# Patient Record
Sex: FEMALE | URBAN - METROPOLITAN AREA
[De-identification: names, ages, dates, MRNs, and addresses within clinical notes are randomized per-mention and may not be internally consistent; named-entity substitution may affect disease eponyms.]

---

## 2018-08-02 LAB — HEMOCCULT STL QL IA: NEGATIVE

## 2018-08-14 ENCOUNTER — TRANSFERRED RECORDS (OUTPATIENT)
Dept: HEALTH INFORMATION MANAGEMENT | Facility: CLINIC | Age: 63
End: 2018-08-14

## 2019-02-21 ENCOUNTER — TRANSFERRED RECORDS (OUTPATIENT)
Dept: HEALTH INFORMATION MANAGEMENT | Facility: CLINIC | Age: 64
End: 2019-02-21

## 2019-03-21 ENCOUNTER — TRANSFERRED RECORDS (OUTPATIENT)
Dept: HEALTH INFORMATION MANAGEMENT | Facility: CLINIC | Age: 64
End: 2019-03-21

## 2019-03-22 ENCOUNTER — TRANSFERRED RECORDS (OUTPATIENT)
Dept: HEALTH INFORMATION MANAGEMENT | Facility: CLINIC | Age: 64
End: 2019-03-22

## 2019-03-22 ENCOUNTER — MEDICAL CORRESPONDENCE (OUTPATIENT)
Dept: HEALTH INFORMATION MANAGEMENT | Facility: CLINIC | Age: 64
End: 2019-03-22

## 2019-04-01 ENCOUNTER — DOCUMENTATION ONLY (OUTPATIENT)
Dept: CARE COORDINATION | Facility: CLINIC | Age: 64
End: 2019-04-01

## 2019-04-01 DIAGNOSIS — M54.9 BACK PAIN: Primary | ICD-10-CM

## 2019-04-04 ENCOUNTER — ANCILLARY PROCEDURE (OUTPATIENT)
Dept: GENERAL RADIOLOGY | Facility: CLINIC | Age: 64
End: 2019-04-04
Attending: NEUROLOGICAL SURGERY
Payer: COMMERCIAL

## 2019-04-04 ENCOUNTER — OFFICE VISIT (OUTPATIENT)
Dept: NEUROSURGERY | Facility: CLINIC | Age: 64
End: 2019-04-04
Payer: COMMERCIAL

## 2019-04-04 VITALS
SYSTOLIC BLOOD PRESSURE: 129 MMHG | HEART RATE: 74 BPM | OXYGEN SATURATION: 96 % | WEIGHT: 139.7 LBS | DIASTOLIC BLOOD PRESSURE: 83 MMHG

## 2019-04-04 DIAGNOSIS — E55.9 VITAMIN D DEFICIENCY: ICD-10-CM

## 2019-04-04 DIAGNOSIS — M85.80 OSTEOPENIA DETERMINED BY X-RAY: ICD-10-CM

## 2019-04-04 DIAGNOSIS — M41.25 OTHER IDIOPATHIC SCOLIOSIS, THORACOLUMBAR REGION: Primary | ICD-10-CM

## 2019-04-04 DIAGNOSIS — M54.9 BACK PAIN: ICD-10-CM

## 2019-04-04 RX ORDER — LEVOTHYROXINE SODIUM 125 UG/1
125 TABLET ORAL DAILY
COMMUNITY
Start: 2017-09-28

## 2019-04-04 SDOH — HEALTH STABILITY: MENTAL HEALTH: HOW OFTEN DO YOU HAVE A DRINK CONTAINING ALCOHOL?: NEVER

## 2019-04-04 ASSESSMENT — ENCOUNTER SYMPTOMS
DEPRESSION: 1
ARTHRALGIAS: 0
DECREASED CONCENTRATION: 0
MUSCLE WEAKNESS: 1
NIGHT SWEATS: 0
INSOMNIA: 0
WEIGHT LOSS: 0
MYALGIAS: 1
POLYDIPSIA: 0
MUSCLE CRAMPS: 1
FATIGUE: 1
FEVER: 0
CHILLS: 0
NECK PAIN: 1
DECREASED APPETITE: 1
ALTERED TEMPERATURE REGULATION: 0
HALLUCINATIONS: 0
POLYPHAGIA: 0
INCREASED ENERGY: 1
JOINT SWELLING: 0
NERVOUS/ANXIOUS: 1
STIFFNESS: 0
BACK PAIN: 1
PANIC: 0
WEIGHT GAIN: 0

## 2019-04-04 ASSESSMENT — PAIN SCALES - GENERAL: PAINLEVEL: MILD PAIN (3)

## 2019-04-04 NOTE — NURSING NOTE
Chief Complaint   Patient presents with     New Patient     [facility] NEW SCOLIOSIS       Yaneli Bains, EMT

## 2019-04-04 NOTE — LETTER
4/4/2019       RE: Brit Benavidez  [address]  [facility] [address]     Dear Colleague,    Thank you for referring your patient, Brit Benavidez, to the [facility] NEUROSURGERY at [facility]. Please see a copy of my visit note below.        Neurosurgery Clinic Note    Chief Complaint: thoracolumbar back pain    History of Present Illness:  It was a pleasure to evaluate Brit Benavidez in clinic today at the kind referral of Tanya Young  [facility]  [address] [place] [address].    Brit Benavidez is a 64 year old female presenting with thoracolumbar junctional back pain, no radiation to legs, radiation to left flank, exacerbated by going to the gym, no relief with PT, minor relief with rest. No prior spine surgery, was told in past several years she has scoliosis, no prior knowledge before that. No known family members with scoliosis  Menopause >10 years ago  Prior hx of vitamin D deficiency, no recent level per my review of her medical records from [facility]    Past Medical History- hypothyroidism, diverticulosis, chronic UTI, frozen shoulder syndrome    Past Surgical History- shoulder surgery, no prior spine surgery    Social History     Socioeconomic History     Marital status:      Spouse name: None     Number of children: None     Years of education: None     Highest education level: None   Occupational History     None   Social Needs     Financial resource strain: None     Food insecurity:     Worry: None     Inability: None     Transportation needs:     Medical: None     Non-medical: None   Tobacco Use     Smoking status: Never Smoker     Smokeless tobacco: Never Used   Substance and Sexual Activity     Alcohol use: No     Frequency: Never     Drug use: No     Sexual activity: None   Lifestyle     Physical activity:     Days per week: None     Minutes per session: None     Stress: None   Relationships     Social connections:     Talks on  phone: None     Gets together: None     Attends Bahai service: None     Active member of club or organization: None     Attends meetings of clubs or organizations: None     Relationship status: None     Intimate partner violence:     Fear of current or ex partner: None     Emotionally abused: None     Physically abused: None     Forced sexual activity: None   Other Topics Concern     None   Social History Narrative     None       Family History- no scoliosis    IMAGING per my own measurement and interpretation:  Xrays:standing long cassette 04/04/19                Resulted Imaging/Labs:    Bone Density: none    MRI lumbar/thoracic spine 3/21/19 obtained from outside hospital- no central stenosis, multilevel spondylosis     Vitamin D:  Vitamin D Deficiency Screening Results:  No results found for: VITDT  No results found for: HSB226, DUUG966, [id number], VITD3, D2VIT, D3VIT, DTOT, [id number], [id number], [id number], [id number], [id number], [id number]    Nutritional Status:  No obesity  No results found for: ALBUMIN    Diabetes Screening:  No results found for: A1C    Nicotine Usage:  No    Physical Exam   /83 (BP Location: Left arm, Patient Position: Chair, Cuff Size: Adult Regular)   Pulse 74   Wt 63.4 kg (139 lb 11.2 oz)   SpO2 96%   Constitutional: Oriented to person, place, and time. Appears well-developed and well-nourished. Cooperative. No distress.   HENT:   Head: Normocephalic and atraumatic.   Eyes: Conjunctivae are normal.  Neck: Normal range of motion. Neck supple. No spinous process tenderness and no muscular tenderness present. No tracheal deviation present.  Cardiovascular: Normal rate and regular rhythm.    Pulmonary/Chest: Effort normal and breath sounds normal.  Abdominal: Soft. Bowel sounds are normal. Exhibits no distension. There is no tenderness.   Musculoskeletal:   Cervical flexion-extension range of motion: normal  Lumbar flexion/extension range of motion: normal    Neurological:  alert and oriented to person, place, and time.   No cranial nerve deficit   sensory deficit none  Gait normal    Reflex Scores:        Tricep reflexes are 2+ on the right side and 2+ on the left side.       Bicep reflexes are 2+ on the right side and 2+ on the left side.       Brachioradialis reflexes are 2+ on the right side and 2+ on the left side.       Patellar reflexes are 2+ on the right side and 2+ on the left side.       Achilles reflexes are 2+ on the right side and 2+ on the left side.    STRENGTH LEFT RIGHT   Deltoid 5 5   Bicep 5 5   Wrist Extensor 5 5   Tricep 5 5   Finger flexion 5 5   Finger abduction 5 5    5 5       Hip Flexion     5     5   Knee Extension 5 5   Ankle Dorsiflexion 5 5   Extensor Hallucis Longus 5 5   Plantar Flexion 5 5   Foot eversion 5 5   Foot inversion 5 5     No Lhermitte's, No Spurling's  No Jacoby's   No ankle clonus  Able to tandem walk    Skin: Skin is warm, dry and intact.   Psychiatric: Normal mood and affect. Speech is normal and behavior is normal.    ASSESSMENT:  Brit Benavidez is a 64 year old female with thoracolumbar scoliosis, suspected idiopathic progression into adulthood    PLAN:  I explained that her curve magnitude of >50 degrees and pain are surgical indications since she has had no relief with conservative measures.    The patient has risk factors for osteoporosis and will require DEXA scan and vitamin D level.    Surgery would involve mid thoracic to pelvis fusion with multilevel Smith-Coppola osteotomies and interbody fusion.  This would result in permanent stiffness to her spine which I explained.  I explained the concepts of surgery and the general recovery.  She is unsure if she wants to proceed with surgery.  I asked her to return for further discussion if she wants to pursue surgery following getting her DEXA scan and vitamin D level.  If she does not want to proceed with surgery at this time, I asked her to return in 1 year with repeat  standing long cassette films to evaluate progression of her scoliosis.    Jaky Jeff MD    [facility] Department of Neurosurgery  Complex Spinal Deformity, Scoliosis, and Minimally Invasive Spine Surgery Specialist  Office: [phone]    4/4/2019    I spent 45 minutes in patient care with more than 50% spent in face to face time in counseling.

## 2019-04-04 NOTE — PROGRESS NOTES
Neurosurgery Clinic Note    Chief Complaint: thoracolumbar back pain    History of Present Illness:  It was a pleasure to evaluate Brit Benavidez in clinic today at the kind referral of Tanya Young  [facility]  [address] [place] [id number].    Brit Benavidez is a 64 year old female presenting with thoracolumbar junctional back pain, no radiation to legs, radiation to left flank, exacerbated by going to the gym, no relief with PT, minor relief with rest. No prior spine surgery, was told in past several years she has scoliosis, no prior knowledge before that. No known family members with scoliosis  Menopause >10 years ago  Prior hx of vitamin D deficiency, no recent level per my review of her medical records from [facility]      Review of Systems   Answers for HPI/ROS submitted by the patient on 4/4/2019   General Symptoms: Yes  Skin Symptoms: No  HENT Symptoms: No  EYE SYMPTOMS: No  HEART SYMPTOMS: No  LUNG SYMPTOMS: No  INTESTINAL SYMPTOMS: No  URINARY SYMPTOMS: No  GYNECOLOGIC SYMPTOMS: No  BREAST SYMPTOMS: No  SKELETAL SYMPTOMS: Yes  BLOOD SYMPTOMS: No  NERVOUS SYSTEM SYMPTOMS: No  MENTAL HEALTH SYMPTOMS: Yes  Fever: No  Loss of appetite: Yes  Weight loss: No  Weight gain: No  Fatigue: Yes  Night sweats: No  Chills: No  Increased stress: Yes  Excessive hunger: No  Excessive thirst: No  Feeling hot or cold when others believe the temperature is normal: No  Loss of height: No  Post-operative complications: No  Surgical site pain: No  Hallucinations: No  Change in or Loss of Energy: Yes  Hyperactivity: No  Confusion: No  Back pain: Yes  Muscle aches: Yes  Neck pain: Yes  Swollen joints: No  Joint pain: No  Bone pain: Yes  Muscle cramps: Yes  Muscle weakness: Yes  Joint stiffness: No  Bone fracture: No  Nervous or Anxious: Yes  Depression: Yes  Trouble sleeping: No  Trouble thinking or concentrating: No  Mood changes: Yes  Panic attacks: No      Past Medical History- hypothyroidism,  diverticulosis, chronic UTI, frozen shoulder syndrome        Past Surgical History- shoulder surgery, no prior spine surgery         Social History     Socioeconomic History     Marital status:      Spouse name: None     Number of children: None     Years of education: None     Highest education level: None   Occupational History     None   Social Needs     Financial resource strain: None     Food insecurity:     Worry: None     Inability: None     Transportation needs:     Medical: None     Non-medical: None   Tobacco Use     Smoking status: Never Smoker     Smokeless tobacco: Never Used   Substance and Sexual Activity     Alcohol use: No     Frequency: Never     Drug use: No     Sexual activity: None   Lifestyle     Physical activity:     Days per week: None     Minutes per session: None     Stress: None   Relationships     Social connections:     Talks on phone: None     Gets together: None     Attends Episcopalian service: None     Active member of club or organization: None     Attends meetings of clubs or organizations: None     Relationship status: None     Intimate partner violence:     Fear of current or ex partner: None     Emotionally abused: None     Physically abused: None     Forced sexual activity: None   Other Topics Concern     None   Social History Narrative     None       Family History- no scoliosis    IMAGING per my own measurement and interpretation:  Xrays:standing long cassette 04/04/19                Resulted Imaging/Labs:  Bone Density: none    MRI lumbar/thoracic spine 3/21/19 obtained from outside hospital- no central stenosis, multilevel spondylosis     Vitamin D:  Vitamin D Deficiency Screening Results:  No results found for: VITDT  No results found for: OAD982, KYRY520, [id number], VITD3, D2VIT, D3VIT, DTOT, [id number], [id number], [id number], [id number], [id number], [id number]      Nutritional Status:  No obesity  No results found for: ALBUMIN    Diabetes Screening:  No results  found for: A1C    Nicotine Usage:    No                Physical Exam   /83 (BP Location: Left arm, Patient Position: Chair, Cuff Size: Adult Regular)   Pulse 74   Wt 63.4 kg (139 lb 11.2 oz)   SpO2 96%   Constitutional: Oriented to person, place, and time. Appears well-developed and well-nourished. Cooperative. No distress.   HENT:   Head: Normocephalic and atraumatic.   Eyes: Conjunctivae are normal.  Neck: Normal range of motion. Neck supple. No spinous process tenderness and no muscular tenderness present. No tracheal deviation present.  Cardiovascular: Normal rate and regular rhythm.    Pulmonary/Chest: Effort normal and breath sounds normal.  Abdominal: Soft. Bowel sounds are normal. Exhibits no distension. There is no tenderness.   Musculoskeletal:   Cervical flexion-extension range of motion: normal  Lumbar flexion/extension range of motion: normal    Neurological: alert and oriented to person, place, and time.   No cranial nerve deficit   sensory deficit none  Gait normal      Reflex Scores:        Tricep reflexes are 2+ on the right side and 2+ on the left side.       Bicep reflexes are 2+ on the right side and 2+ on the left side.       Brachioradialis reflexes are 2+ on the right side and 2+ on the left side.       Patellar reflexes are 2+ on the right side and 2+ on the left side.       Achilles reflexes are 2+ on the right side and 2+ on the left side.    STRENGTH LEFT RIGHT   Deltoid 5 5   Bicep 5 5   Wrist Extensor 5 5   Tricep 5 5   Finger flexion 5 5   Finger abduction 5 5    5 5       Hip Flexion     5     5   Knee Extension 5 5   Ankle Dorsiflexion 5 5   Extensor Hallucis Longus 5 5   Plantar Flexion 5 5   Foot eversion 5 5   Foot inversion 5 5     No Lhermitte's, No Spurling's  No Jacoby's   No ankle clonus  Able to tandem walk        Skin: Skin is warm, dry and intact.   Psychiatric: Normal mood and affect. Speech is normal and behavior is normal.        ASSESSMENT:  Brit FIELDS  Pramod is a 64 year old female with thoracolumbar scoliosis, suspected idiopathic progression into adulthood      PLAN:      I explained that her curve magnitude of >50 degrees and pain are surgical indications since she has had no relief with conservative measures.    The patient has risk factors for osteoporosis and will require DEXA scan and vitamin D level.    Surgery would involve mid thoracic to pelvis fusion with multilevel Smith-Coppola osteotomies and interbody fusion.  This would result in permanent stiffness to her spine which I explained.  I explained the concepts of surgery and the general recovery.  She is unsure if she wants to proceed with surgery.  I asked her to return for further discussion if she wants to pursue surgery following getting her DEXA scan and vitamin D level.  If she does not want to proceed with surgery at this time, I asked her to return in 1 year with repeat standing long cassette films to evaluate progression of her scoliosis.      Jaky Jeff MD    [facility] Department of Neurosurgery  Complex Spinal Deformity, Scoliosis, and Minimally Invasive Spine Surgery Specialist  Office: [phone]    4/4/2019      I spent 45 minutes in patient care with more than 50% spent in face to face time in counseling.

## 2019-04-05 LAB
DEPRECATED CALCIDIOL+CALCIFEROL SERPL-MC: <23 UG/L (ref 20–75)
VITAMIN D2 SERPL-MCNC: <5 UG/L
VITAMIN D3 SERPL-MCNC: 18 UG/L

## 2019-04-11 ENCOUNTER — PATIENT OUTREACH (OUTPATIENT)
Dept: NEUROSURGERY | Facility: CLINIC | Age: 64
End: 2019-04-11

## 2019-04-15 ENCOUNTER — PATIENT OUTREACH (OUTPATIENT)
Dept: NEUROSURGERY | Facility: CLINIC | Age: 64
End: 2019-04-15

## 2019-04-15 ENCOUNTER — CARE COORDINATION (OUTPATIENT)
Dept: NEUROSURGERY | Facility: CLINIC | Age: 64
End: 2019-04-15

## 2019-04-15 NOTE — PROGRESS NOTES
Pt called and asked for the results of her DEXA scan.  The results were not available in the chart.  The DEXA scan was done at [facility] in [place], [place].  Writer will ask LPN to call and have the result faxed to us.

## 2019-04-15 NOTE — PROGRESS NOTES
Writer spoke with [facility] Medical records and had latest Dexa Scan imaging and reports pushed. In addition Pt had other Neuro imaging and write had it pushed to [facility] and reports faxed. Writer labled and scanned to patient's chart. Writer notified RN of all of the above.

## 2019-04-19 ENCOUNTER — HEALTH MAINTENANCE LETTER (OUTPATIENT)
Age: 64
End: 2019-04-19

## 2020-03-11 ENCOUNTER — HEALTH MAINTENANCE LETTER (OUTPATIENT)
Age: 65
End: 2020-03-11

## 2021-01-03 ENCOUNTER — HEALTH MAINTENANCE LETTER (OUTPATIENT)
Age: 66
End: 2021-01-03

## 2021-04-25 ENCOUNTER — HEALTH MAINTENANCE LETTER (OUTPATIENT)
Age: 66
End: 2021-04-25

## 2021-10-10 ENCOUNTER — HEALTH MAINTENANCE LETTER (OUTPATIENT)
Age: 66
End: 2021-10-10

## 2022-05-21 ENCOUNTER — HEALTH MAINTENANCE LETTER (OUTPATIENT)
Age: 67
End: 2022-05-21

## 2022-09-18 ENCOUNTER — HEALTH MAINTENANCE LETTER (OUTPATIENT)
Age: 67
End: 2022-09-18

## 2023-05-07 ENCOUNTER — HEALTH MAINTENANCE LETTER (OUTPATIENT)
Age: 68
End: 2023-05-07

## 2023-06-04 ENCOUNTER — HEALTH MAINTENANCE LETTER (OUTPATIENT)
Age: 68
End: 2023-06-04